# Patient Record
Sex: MALE | Race: WHITE | NOT HISPANIC OR LATINO | URBAN - METROPOLITAN AREA
[De-identification: names, ages, dates, MRNs, and addresses within clinical notes are randomized per-mention and may not be internally consistent; named-entity substitution may affect disease eponyms.]

---

## 2018-07-26 ENCOUNTER — OFFICE VISIT (OUTPATIENT)
Dept: URBAN - METROPOLITAN AREA CLINIC 29 | Facility: CLINIC | Age: 78
End: 2018-07-26
Payer: MEDICARE

## 2018-07-26 DIAGNOSIS — H40.1132 PRIMARY OPEN-ANGLE GLAUCOMA, BILATERAL, MODERATE STAGE: Primary | ICD-10-CM

## 2018-07-26 PROCEDURE — 92012 INTRM OPH EXAM EST PATIENT: CPT | Performed by: OPTOMETRIST

## 2018-07-26 ASSESSMENT — INTRAOCULAR PRESSURE
OD: 16
OS: 12

## 2018-07-26 NOTE — IMPRESSION/PLAN
Impression: Primary open-angle glaucoma, bilateral, moderate stage. Code: U90.1301. CCT thick OU. --
Trab OU, --
IOP doing well ou --
ONH stable OU. OU.  Plan: Continue massage qid od, no massage or drops OS. 
orig IOP 26/26, Micha IOP 16/12, OCT 31?/49?(55?/63?), VF OD sup arc OS sup arc/shanta